# Patient Record
Sex: MALE | Race: BLACK OR AFRICAN AMERICAN | NOT HISPANIC OR LATINO | URBAN - METROPOLITAN AREA
[De-identification: names, ages, dates, MRNs, and addresses within clinical notes are randomized per-mention and may not be internally consistent; named-entity substitution may affect disease eponyms.]

---

## 2020-03-27 ENCOUNTER — EMERGENCY (EMERGENCY)
Facility: HOSPITAL | Age: 50
LOS: 1 days | Discharge: HOME | End: 2020-03-27
Attending: EMERGENCY MEDICINE
Payer: MEDICAID

## 2020-03-27 VITALS
OXYGEN SATURATION: 97 % | RESPIRATION RATE: 20 BRPM | DIASTOLIC BLOOD PRESSURE: 76 MMHG | SYSTOLIC BLOOD PRESSURE: 117 MMHG | HEIGHT: 68 IN | TEMPERATURE: 98 F | WEIGHT: 195.11 LBS | HEART RATE: 101 BPM

## 2020-03-27 PROCEDURE — 99284 EMERGENCY DEPT VISIT MOD MDM: CPT

## 2020-03-27 PROCEDURE — 99282 EMERGENCY DEPT VISIT SF MDM: CPT

## 2020-03-27 RX ORDER — PANTOPRAZOLE SODIUM 20 MG/1
0 TABLET, DELAYED RELEASE ORAL
Qty: 0 | Refills: 0 | DISCHARGE

## 2020-03-27 RX ORDER — METFORMIN HYDROCHLORIDE 850 MG/1
0 TABLET ORAL
Qty: 0 | Refills: 0 | DISCHARGE

## 2020-03-27 RX ORDER — ATORVASTATIN CALCIUM 80 MG/1
0 TABLET, FILM COATED ORAL
Qty: 0 | Refills: 0 | DISCHARGE

## 2020-03-27 NOTE — ED ADULT TRIAGE NOTE - CHIEF COMPLAINT QUOTE
tested positive for COVID  sore throat cough  PCP told him to come in - Dr. José ([facility])

## 2020-03-27 NOTE — ED ADULT NURSE NOTE - CHIEF COMPLAINT QUOTE
tested positive for COVID  sore throat cough  PCP told him to come in - Dr. José ([facility])

## 2020-03-27 NOTE — ED PROVIDER NOTE - OBJECTIVE STATEMENT
48 y/o male with c/o nasal congestion, SOB, cough x more than 10 days . He reports that he went to [facility] and tested last Tuesday and result was reported on 3/26/2020 with Positive COVID-19 . Pt came to ED for Severe SOB ,nasal congestion .

## 2020-03-27 NOTE — ED PROVIDER NOTE - PATIENT PORTAL LINK FT
You can access the FollowMyHealth Patient Portal offered by [facility] by registering at the following website: [email or web address]. By joining [facility]’s FollowMyHealth portal, you will also be able to view your health information using other applications (apps) compatible with our system.

## 2020-03-27 NOTE — ED PROVIDER NOTE - PHYSICAL EXAMINATION
PHYSICAL EXAM:      Constitutional: NAD,obese   HEENT: NCAT, PERRLA, EOMI,  Neck: No LAD, No JVD  Respiratory: CTA b/l ,  No Wheezing /rhonchi  Cardiovascular: S1 and S2 +  Gastrointestinal:  Soft, NT, ND, BS +  Extremities: No peripheral edema  Neurological: AAO x 3,  Gross motor and sensational intact. no focal deficits

## 2020-03-27 NOTE — ED PROVIDER NOTE - ATTENDING CONTRIBUTION TO CARE
attending Hilary: 49yM with nasal congestion, SOB, cough x 10 days. Reportedly tested positive with COVID. Here for persistent SOB and congestion. Normal O2 saturation, normal work of breathing, well appearing. Will obtain ambulatory saturation, anticipate DC with instruction for continued quarantine and strict return precautions

## 2020-03-27 NOTE — ED PROVIDER NOTE - NSFOLLOWUPINSTRUCTIONS_ED_ALL_ED_FT
You were seen and evaluated for possible COVID 19 infection. Testing was NOT performed. We think you are safe for discharge and would like you to follow up in a two to three days with your doctor by phone or in person.   You should treat fevers by taking Tylenol as directed as needed.    You should stay hydrated and increase the amount of fluid you drink.  Return to the Emergency Department if your shortness of breath becomes worse, you become weak, or new symptoms develop.    You should monitor your temperature and quarantine yourself away from others - particularly the elderly, ill, or immunosuppressed - for the next 14 days.    We recommend the below precautionary steps from now until 14 days from when you returned from your travel or date of your last known possible contact:  - Do not go to work, school, or public areas. Avoid using public transportation, ride-sharing, or taxis.  -Wear a mask whenever you are around other people.  -Avoid sharing personal hosehold items. You should not share dishes, drinking glasses, cups, eating utensils, towels, or bedding with other people or pets in your home. After using these items, they should be washed thoroughly with soap and water.   - Avoid touching your eyes, nose, and mouth with your hands.  - Wash your hands often with soap and water for at least 15 to 20 seconds or clean your hands with an alcohol-based hand  that contains 60 to 95% alcohol, covering all surfaces of your hands and rubbing them together until they feel dry. Soap and water should be used preferentially if hands are visibly dirty.